# Patient Record
Sex: FEMALE | Race: BLACK OR AFRICAN AMERICAN | NOT HISPANIC OR LATINO | Employment: OTHER | URBAN - METROPOLITAN AREA
[De-identification: names, ages, dates, MRNs, and addresses within clinical notes are randomized per-mention and may not be internally consistent; named-entity substitution may affect disease eponyms.]

---

## 2023-11-17 RX ORDER — TRAZODONE HYDROCHLORIDE 100 MG/1
100 TABLET ORAL NIGHTLY
COMMUNITY
Start: 2023-04-11 | End: 2024-04-17

## 2024-04-16 DIAGNOSIS — F51.01 PRIMARY INSOMNIA: Primary | ICD-10-CM

## 2024-04-17 RX ORDER — TRAZODONE HYDROCHLORIDE 100 MG/1
100 TABLET ORAL NIGHTLY PRN
Qty: 90 TABLET | Refills: 0 | Status: SHIPPED | OUTPATIENT
Start: 2024-04-17 | End: 2024-04-18 | Stop reason: SDUPTHER

## 2024-04-18 ENCOUNTER — OFFICE VISIT (OUTPATIENT)
Dept: NEUROLOGY | Facility: CLINIC | Age: 79
End: 2024-04-18
Payer: MEDICARE

## 2024-04-18 VITALS — SYSTOLIC BLOOD PRESSURE: 107 MMHG | HEART RATE: 66 BPM | DIASTOLIC BLOOD PRESSURE: 62 MMHG | TEMPERATURE: 97.3 F

## 2024-04-18 DIAGNOSIS — G45.9 TIA (TRANSIENT ISCHEMIC ATTACK): ICD-10-CM

## 2024-04-18 DIAGNOSIS — R47.01 APHASIA: ICD-10-CM

## 2024-04-18 DIAGNOSIS — F02.C0 SEVERE LATE ONSET ALZHEIMER'S DEMENTIA WITHOUT BEHAVIORAL DISTURBANCE, PSYCHOTIC DISTURBANCE, MOOD DISTURBANCE, OR ANXIETY (MULTI): Primary | ICD-10-CM

## 2024-04-18 DIAGNOSIS — F51.01 PRIMARY INSOMNIA: ICD-10-CM

## 2024-04-18 DIAGNOSIS — G30.1 SEVERE LATE ONSET ALZHEIMER'S DEMENTIA WITHOUT BEHAVIORAL DISTURBANCE, PSYCHOTIC DISTURBANCE, MOOD DISTURBANCE, OR ANXIETY (MULTI): Primary | ICD-10-CM

## 2024-04-18 PROCEDURE — 1159F MED LIST DOCD IN RCRD: CPT | Performed by: PSYCHIATRY & NEUROLOGY

## 2024-04-18 PROCEDURE — 1036F TOBACCO NON-USER: CPT | Performed by: PSYCHIATRY & NEUROLOGY

## 2024-04-18 PROCEDURE — 1160F RVW MEDS BY RX/DR IN RCRD: CPT | Performed by: PSYCHIATRY & NEUROLOGY

## 2024-04-18 PROCEDURE — 99214 OFFICE O/P EST MOD 30 MIN: CPT | Performed by: PSYCHIATRY & NEUROLOGY

## 2024-04-18 RX ORDER — MULTIVITAMIN
1 TABLET ORAL
COMMUNITY
Start: 2023-04-05

## 2024-04-18 RX ORDER — ATORVASTATIN CALCIUM 40 MG/1
40 TABLET, FILM COATED ORAL
COMMUNITY
Start: 2024-01-12

## 2024-04-18 RX ORDER — TRAZODONE HYDROCHLORIDE 100 MG/1
100 TABLET ORAL NIGHTLY
Qty: 90 TABLET | Refills: 3 | Status: SHIPPED | OUTPATIENT
Start: 2024-04-18

## 2024-04-18 RX ORDER — CLOPIDOGREL BISULFATE 75 MG/1
75 TABLET ORAL
COMMUNITY
Start: 2024-03-28 | End: 2024-06-26

## 2024-04-18 RX ORDER — SERTRALINE HYDROCHLORIDE 100 MG/1
100 TABLET, FILM COATED ORAL
COMMUNITY
Start: 2024-01-18

## 2024-04-18 RX ORDER — ERGOCALCIFEROL 1.25 MG/1
1 CAPSULE ORAL
COMMUNITY
Start: 2024-03-22

## 2024-04-18 RX ORDER — LANOLIN ALCOHOL/MO/W.PET/CERES
1000 CREAM (GRAM) TOPICAL
COMMUNITY
Start: 2023-07-18

## 2024-04-18 RX ORDER — TRAZODONE HYDROCHLORIDE 100 MG/1
100 TABLET ORAL NIGHTLY PRN
Qty: 90 TABLET | Refills: 3 | Status: SHIPPED | OUTPATIENT
Start: 2024-04-18 | End: 2024-04-18 | Stop reason: SDUPTHER

## 2024-04-18 RX ORDER — MIRTAZAPINE 15 MG/1
15 TABLET, FILM COATED ORAL DAILY PRN
COMMUNITY
Start: 2024-01-03

## 2024-04-18 NOTE — PROGRESS NOTES
NEUROLOGY OUTPATIENT FOLLOW-UP NOTE    Assessment/Plan   Diagnoses and all orders for this visit:  Severe late onset Alzheimer's dementia without behavioral disturbance, psychotic disturbance, mood disturbance, or anxiety (Multi)  Primary insomnia  -     traZODone (Desyrel) 100 mg tablet; Take 1 tablet (100 mg) by mouth once daily at bedtime.  TIA (transient ischemic attack)  Aphasia    IMPRESSION:  Advanced Alzheimer's with aphasia and behavioral disturbance.  Insomnia.  Recent TIA.    PLAN:  Continue clopidogrel and atorvastatin for stroke prophylaxis.  I refilled trazodone for insomnia.  Quetiapine prn agitation.  I will see her in 3-4 months or prn.      Sergio Sadler Jr., M.D., FAAN   ----------    Subjective     Ingrid Christianson is a 78 y.o. year old female here for stroke follow-up.    The patient had transient facial droop in March and was admitted to [facility].  She was started on clopidogrel.  Trazodone still helps with insomnia, and when she has it, she sleeps all night.  She can have intermittent agitation in the evening, typically 3-4 nights a week.    No past medical history on file.  Past Surgical History:   Procedure Laterality Date    OTHER SURGICAL HISTORY  06/30/2022    Hernia repair     Social History     Tobacco Use    Smoking status: Never    Smokeless tobacco: Never   Substance Use Topics    Alcohol use: Not Currently     family history is not on file.    Current Outpatient Medications:     atorvastatin (Lipitor) 40 mg tablet, Take 1 tablet (40 mg) by mouth once daily., Disp: , Rfl:     clopidogrel (Plavix) 75 mg tablet, 1 tablet (75 mg) by Enteral route once daily., Disp: , Rfl:     cyanocobalamin (Vitamin B-12) 1,000 mcg tablet, Take 1 tablet (1,000 mcg) by mouth once daily., Disp: , Rfl:     ergocalciferol (Vitamin D-2) 1.25 MG (13409 UT) capsule, Take 1 capsule (1,250 mcg) by mouth 1 (one) time per week., Disp: , Rfl:     mirtazapine (Remeron) 15 mg tablet, Take 1 tablet (15 mg)  "by mouth once daily as needed., Disp: , Rfl:     multivitamin tablet, Take 1 tablet by mouth once daily., Disp: , Rfl:     sertraline (Zoloft) 100 mg tablet, Take 1 tablet (100 mg) by mouth once daily., Disp: , Rfl:     aspirin-calcium carbonate 81 mg-300 mg calcium(777 mg) tablet, Take by mouth., Disp: , Rfl:     calcium carbonate-vit D3-min (Citrical & Minerals + Vit D) 600 mg calcium- 200 unit tablet, Take by mouth., Disp: , Rfl:     traZODone (Desyrel) 100 mg tablet, TAKE 1 TABLET AT BEDTIME AS NEEDED FOR SLEEP, Disp: 90 tablet, Rfl: 0  No Known Allergies    Objective     Temp 36.3 °C (97.3 °F)     CONSTITUTIONAL:  No acute distress    MENTAL STATUS:  Awake, alert, cannot assess orientation because of current cognitive status.    SPEECH AND LANGUAGE:  Cannot name or repeat, follows no commands, can say \"no\" clearly at times.    CRANIAL NERVES:  II-Vision grossly present as she tracks me as I move around the room.    III/IV/VI--EOMs are present in all directions with the oculocephalic maneuver.  Pupils are symmetrically reactive in dim light.  No ptosis.    V--Normal corneals bilaterally.    VII--No facial asymmetry.    VIII--Hearing grossly present.    IX/X--Unable because of cognitive status    XI--Spontaneous, antigravity trapezius power bilaterally.    XII--Tongue protrudes without deviation.    MOTOR:  Symmetric antigravity power, tone, and bulk in both arms and both legs.  Prefers to keep left fingers clenched but the fingers can be opened with minimal force.    SENSORY:  Grimaces to nailbed pressure in all four extremities.    COORDINATION:  Unable because of cognitive condition.    REFLEXES are normal and symmetric at the biceps, triceps, brachioradialis, patella, and ankle.  The plantar responses are flexor.    GAIT is normal, without steppage, ataxia, shuffling, or spasticity.      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     Arterial blood flow was measured to detect acute large vessel occlusion   by computer aided detection software: Not Performed.   Concordance between software and imaging review: Not Applicable.     COMMUNICATION:  Communicated with GILBERTO BEAVERS on 3/23/2024 3:39 PM  via   verbal communication.     Cranial CT 3/25/2024 at [facility] (copied):  IMPRESSION:    No acute intracranial findings. Diffuse volume loss. Chronic ischemic  white matter changes.            Transcribed Using Voice Recognition  Transcribe Date/Time: Mar 25 2024 12:21P    Dictated by: LESLIE SAUER MD    This examination was interpreted and the report reviewed and  electronically signed by:  LESLIE SAUER MD on Mar 25 2024 12:25PM  EST  Narrative    * * *Final Report* * *    DATE OF EXAM: Mar 25 2024 11:10AM      [facility] [place]   0504  -  CT BRAIN WO IVCON  / ACCESSION #  [id number]    PROCEDURE REASON: Neuro deficit, acute, stroke suspected        * * * * Physician Interpretation * * * *    RESULT: EXAMINATION: CT BRAIN WO IVCON    CLINICAL HISTORY: Stroke.    TECHNIQUE:  Serial axial images without IV contrast were obtained from  the vertex to the foramen magnum.  MQ:  CTBWO_3    CT Radiation dose: Integrated Dose-Length Product (DLP) for this visit =    836  mGy*cm  CT Dose Reduction Employed: Automated exposure control (AEC)    COMPARISON: Head CT March 23, 2024      RESULT:    There is diffuse volume loss. Patchy foci of low attenuation in the  periventricular and subcortical white matter are nonspecific and most  consistent with chronic small vessel ischemia. There is no hemorrhage,  edema or mass effect. There  are no new foci of abnormal attenuation in  the brain parenchyma within the limitations of the study. Osseous  structures are intact. There is patchy mucosal thickening in bilateral  ethmoid air cells.     (topogram) images: No additional findings.    ---  Sergio Sadler Jr., M.D., [facility]

## 2024-04-18 NOTE — LETTER
April 18, 2024     Zahraa Pa DO  [address] [address]    Patient: Ingrid Christianson   YOB: 1945   Date of Visit: 4/18/2024       Dear Dr. Zahraa Pa DO:    Thank you for allowing me to continue in the neurological care of your patient,  Ingrid Christianson. Below are my notes for this visit.  If you have questions, please do not hesitate to call me.        Sincerely,     Sergio Sadler Jr., M.D., FAAN     ______________________________________________________________________________________    NEUROLOGY OUTPATIENT FOLLOW-UP NOTE    Assessment/Plan  Diagnoses and all orders for this visit:  Severe late onset Alzheimer's dementia without behavioral disturbance, psychotic disturbance, mood disturbance, or anxiety (Multi)  Primary insomnia  -     traZODone (Desyrel) 100 mg tablet; Take 1 tablet (100 mg) by mouth once daily at bedtime.  TIA (transient ischemic attack)  Aphasia    IMPRESSION:  Advanced Alzheimer's with aphasia and behavioral disturbance.  Insomnia.  Recent TIA.    PLAN:  Continue clopidogrel and atorvastatin for stroke prophylaxis.  I refilled trazodone for insomnia.  Quetiapine prn agitation.  I will see her in 3-4 months or prn.      Sergio Sadler Jr., M.D., FAAN   ----------    Subjective    Ingrid Christianson is a 78 y.o. year old female here for stroke follow-up.    The patient had transient facial droop in March and was admitted to [facility].  She was started on clopidogrel.  Trazodone still helps with insomnia, and when she has it, she sleeps all night.  She can have intermittent agitation in the evening, typically 3-4 nights a week.    No past medical history on file.  Past Surgical History:   Procedure Laterality Date   • OTHER SURGICAL HISTORY  06/30/2022    Hernia repair     Social History     Tobacco Use   • Smoking status: Never   • Smokeless tobacco: Never   Substance Use Topics   • Alcohol use: Not  "Currently     family history is not on file.    Current Outpatient Medications:   •  atorvastatin (Lipitor) 40 mg tablet, Take 1 tablet (40 mg) by mouth once daily., Disp: , Rfl:   •  clopidogrel (Plavix) 75 mg tablet, 1 tablet (75 mg) by Enteral route once daily., Disp: , Rfl:   •  cyanocobalamin (Vitamin B-12) 1,000 mcg tablet, Take 1 tablet (1,000 mcg) by mouth once daily., Disp: , Rfl:   •  ergocalciferol (Vitamin D-2) 1.25 MG (61037 UT) capsule, Take 1 capsule (1,250 mcg) by mouth 1 (one) time per week., Disp: , Rfl:   •  mirtazapine (Remeron) 15 mg tablet, Take 1 tablet (15 mg) by mouth once daily as needed., Disp: , Rfl:   •  multivitamin tablet, Take 1 tablet by mouth once daily., Disp: , Rfl:   •  sertraline (Zoloft) 100 mg tablet, Take 1 tablet (100 mg) by mouth once daily., Disp: , Rfl:   •  aspirin-calcium carbonate 81 mg-300 mg calcium(777 mg) tablet, Take by mouth., Disp: , Rfl:   •  calcium carbonate-vit D3-min (Citrical & Minerals + Vit D) 600 mg calcium- 200 unit tablet, Take by mouth., Disp: , Rfl:   •  traZODone (Desyrel) 100 mg tablet, TAKE 1 TABLET AT BEDTIME AS NEEDED FOR SLEEP, Disp: 90 tablet, Rfl: 0  No Known Allergies    Objective    Temp 36.3 °C (97.3 °F)     CONSTITUTIONAL:  No acute distress    MENTAL STATUS:  Awake, alert, cannot assess orientation because of current cognitive status.    SPEECH AND LANGUAGE:  Cannot name or repeat, follows no commands, can say \"no\" clearly at times.    CRANIAL NERVES:  II-Vision grossly present as she tracks me as I move around the room.    III/IV/VI--EOMs are present in all directions with the oculocephalic maneuver.  Pupils are symmetrically reactive in dim light.  No ptosis.    V--Normal corneals bilaterally.    VII--No facial asymmetry.    VIII--Hearing grossly present.    IX/X--Unable because of cognitive status    XI--Spontaneous, antigravity trapezius power bilaterally.    XII--Tongue protrudes without deviation.    MOTOR:  Symmetric antigravity " power, tone, and bulk in both arms and both legs.  Prefers to keep left fingers clenched but the fingers can be opened with minimal force.    SENSORY:  Grimaces to nailbed pressure in all four extremities.    COORDINATION:  Unable because of cognitive condition.    REFLEXES are normal and symmetric at the biceps, triceps, brachioradialis, patella, and ankle.  The plantar responses are flexor.    GAIT is normal, without steppage, ataxia, shuffling, or spasticity.      Cranial CT and cranial/cervical CTA 3/23/2024 at [facility] (copied):    IMPRESSION:     Severe stenoses at the proximal left MCA M1 segment with patent distal   flow.     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     Arterial blood flow was measured to detect acute large vessel occlusion   by computer aided detection software: Not Performed.   Concordance between software and imaging review: Not Applicable.     COMMUNICATION:  Communicated with GILBERTO BEAVERS on 3/23/2024 3:39 PM  via   verbal communication.     Cranial CT 3/25/2024 at [facility] (copied):  IMPRESSION:    No acute intracranial findings. Diffuse volume loss. Chronic ischemic  white matter changes.            Transcribed Using Voice Recognition  Transcribe Date/Time: Mar 25 2024 12:21P    Dictated by: LESLIE SAUER MD    This examination was interpreted and the report reviewed and  electronically signed by:  LESLIE SAUER MD on Mar 25 2024 12:25PM  EST  Narrative    * * *Final Report* * *    DATE OF EXAM: Mar 25 2024 11:10AM      [facility] [place]   0504  -  CT BRAIN WO IVCON  / ACCESSION #  [id number]    PROCEDURE REASON: Neuro deficit, acute, stroke suspected        * * * * Physician Interpretation * * * *    RESULT: EXAMINATION: CT BRAIN WO  IVCON    CLINICAL HISTORY: Stroke.    TECHNIQUE:  Serial axial images without IV contrast were obtained from  the vertex to the foramen magnum.  MQ:  CTBWO_3    CT Radiation dose: Integrated Dose-Length Product (DLP) for this visit =    836  mGy*cm  CT Dose Reduction Employed: Automated exposure control (AEC)    COMPARISON: Head CT March 23, 2024      RESULT:    There is diffuse volume loss. Patchy foci of low attenuation in the  periventricular and subcortical white matter are nonspecific and most  consistent with chronic small vessel ischemia. There is no hemorrhage,  edema or mass effect. There are no new foci of abnormal attenuation in  the brain parenchyma within the limitations of the study. Osseous  structures are intact. There is patchy mucosal thickening in bilateral  ethmoid air cells.     (topogram) images: No additional findings.    ---  Sergio Sadler Jr., M.D., FAAN

## 2024-04-23 ENCOUNTER — TELEPHONE (OUTPATIENT)
Dept: NEUROLOGY | Facility: CLINIC | Age: 79
End: 2024-04-23

## 2024-04-23 DIAGNOSIS — F02.C0 SEVERE LATE ONSET ALZHEIMER'S DEMENTIA WITHOUT BEHAVIORAL DISTURBANCE, PSYCHOTIC DISTURBANCE, MOOD DISTURBANCE, OR ANXIETY (MULTI): Primary | ICD-10-CM

## 2024-04-23 DIAGNOSIS — G30.1 SEVERE LATE ONSET ALZHEIMER'S DEMENTIA WITHOUT BEHAVIORAL DISTURBANCE, PSYCHOTIC DISTURBANCE, MOOD DISTURBANCE, OR ANXIETY (MULTI): Primary | ICD-10-CM

## 2024-04-23 RX ORDER — QUETIAPINE FUMARATE 50 MG/1
50 TABLET, FILM COATED ORAL NIGHTLY
Qty: 30 TABLET | Refills: 3 | Status: SHIPPED | OUTPATIENT
Start: 2024-04-23 | End: 2024-08-21

## 2024-07-25 ENCOUNTER — APPOINTMENT (OUTPATIENT)
Dept: NEUROLOGY | Facility: CLINIC | Age: 79
End: 2024-07-25
Payer: MEDICARE

## 2024-07-25 VITALS
WEIGHT: 88 LBS | TEMPERATURE: 97.3 F | DIASTOLIC BLOOD PRESSURE: 60 MMHG | BODY MASS INDEX: 7.99 KG/M2 | SYSTOLIC BLOOD PRESSURE: 120 MMHG | HEART RATE: 60 BPM

## 2024-07-25 DIAGNOSIS — G30.1 SEVERE LATE ONSET ALZHEIMER'S DEMENTIA WITHOUT BEHAVIORAL DISTURBANCE, PSYCHOTIC DISTURBANCE, MOOD DISTURBANCE, OR ANXIETY (MULTI): Primary | ICD-10-CM

## 2024-07-25 DIAGNOSIS — R47.01 APHASIA: ICD-10-CM

## 2024-07-25 DIAGNOSIS — F02.C0 SEVERE LATE ONSET ALZHEIMER'S DEMENTIA WITHOUT BEHAVIORAL DISTURBANCE, PSYCHOTIC DISTURBANCE, MOOD DISTURBANCE, OR ANXIETY (MULTI): Primary | ICD-10-CM

## 2024-07-25 DIAGNOSIS — F51.01 PRIMARY INSOMNIA: ICD-10-CM

## 2024-07-25 PROCEDURE — 1159F MED LIST DOCD IN RCRD: CPT | Performed by: PSYCHIATRY & NEUROLOGY

## 2024-07-25 PROCEDURE — 99214 OFFICE O/P EST MOD 30 MIN: CPT | Performed by: PSYCHIATRY & NEUROLOGY

## 2024-07-25 PROCEDURE — 1160F RVW MEDS BY RX/DR IN RCRD: CPT | Performed by: PSYCHIATRY & NEUROLOGY

## 2024-07-25 PROCEDURE — 1036F TOBACCO NON-USER: CPT | Performed by: PSYCHIATRY & NEUROLOGY

## 2024-07-25 RX ORDER — DOCUSATE SODIUM 100 MG/1
100 CAPSULE, LIQUID FILLED ORAL 2 TIMES DAILY
COMMUNITY

## 2024-07-25 RX ORDER — CLOPIDOGREL BISULFATE 75 MG/1
75 TABLET ORAL
COMMUNITY
Start: 2024-05-17 | End: 2024-08-15

## 2024-07-25 NOTE — PROGRESS NOTES
NEUROLOGY OUTPATIENT FOLLOW-UP NOTE    Assessment/Plan   Diagnoses and all orders for this visit:  Severe late onset Alzheimer's dementia without behavioral disturbance, psychotic disturbance, mood disturbance, or anxiety (Multi)  Primary insomnia  Aphasia      IMPRESSION:  Advanced Alzheimer's with aphasia and behavioral disturbance.  Insomnia.     PLAN:  Continue clopidogrel and atorvastatin for stroke prophylaxis.  To toncinue trazodone for insomnia and nightly quetiapine for agitation.  I filled out an expert evaluation form at the visit.  I will see her again in six months or prn.      Sergio Sadler Jr., M.D., FAAN   ----------    Subjective     Ingrid Christianson is a 79 y.o. year old female here for follow-up.  A daughter accompanies her today and provides all history.    Quetiapine helps the agitation and she is given it nightly.  Trazodone continues to help insomnia.  No worsening of memory loss.    No past medical history on file.  Past Surgical History:   Procedure Laterality Date    OTHER SURGICAL HISTORY  06/30/2022    Hernia repair     Social History     Tobacco Use    Smoking status: Former     Types: Cigarettes    Smokeless tobacco: Never   Substance Use Topics    Alcohol use: Not Currently     family history is not on file.    Current Outpatient Medications:     clopidogrel (Plavix) 75 mg tablet, 1 tablet (75 mg) by Enteral route once daily., Disp: , Rfl:     aspirin-calcium carbonate 81 mg-300 mg calcium(777 mg) tablet, Take by mouth., Disp: , Rfl:     atorvastatin (Lipitor) 40 mg tablet, Take 1 tablet (40 mg) by mouth once daily., Disp: , Rfl:     calcium carbonate-vit D3-min (Citrical & Minerals + Vit D) 600 mg calcium- 200 unit tablet, Take by mouth., Disp: , Rfl:     cyanocobalamin (Vitamin B-12) 1,000 mcg tablet, Take 1 tablet (1,000 mcg) by mouth once daily., Disp: , Rfl:     docusate sodium (Colace) 100 mg capsule, Take 100 capsules (10,000 mg) by mouth 2 times a day., Disp: , Rfl:      "ergocalciferol (Vitamin D-2) 1.25 MG (56197 UT) capsule, Take 1 capsule (1,250 mcg) by mouth 1 (one) time per week., Disp: , Rfl:     mirtazapine (Remeron) 15 mg tablet, Take 1 tablet (15 mg) by mouth once daily as needed., Disp: , Rfl:     multivitamin tablet, Take 1 tablet by mouth once daily., Disp: , Rfl:     QUEtiapine (SeroqueL) 50 mg tablet, Take 1 tablet (50 mg) by mouth once daily at bedtime., Disp: 30 tablet, Rfl: 3    sertraline (Zoloft) 100 mg tablet, Take 1 tablet (100 mg) by mouth once daily., Disp: , Rfl:     traZODone (Desyrel) 100 mg tablet, Take 1 tablet (100 mg) by mouth once daily at bedtime., Disp: 90 tablet, Rfl: 3  No Known Allergies    Objective     /60   Pulse 60   Temp 36.3 °C (97.3 °F)   Wt (!) 39.9 kg (88 lb)   BMI 7.99 kg/m²       CONSTITUTIONAL:  No acute distress     MENTAL STATUS:  Awake, alert, cannot assess orientation because of current cognitive status.     SPEECH AND LANGUAGE:  Cannot name or repeat, follows no commands, can say \"no\" clearly at times.     CRANIAL NERVES:  II-Vision grossly present as she tracks me as I move around the room.     III/IV/VI--EOMs are present in all directions with the oculocephalic maneuver.  Pupils are symmetrically reactive in dim light.  No ptosis.     V--Normal corneals bilaterally.     VII--No facial asymmetry.     VIII--Hearing grossly present.     IX/X--Unable because of cognitive status     XI--Spontaneous, antigravity trapezius power bilaterally.     XII--Tongue protrudes without deviation.     MOTOR:  Symmetric antigravity power, tone, and bulk in both arms and both legs.  Prefers to keep left fingers clenched but the fingers can be opened with minimal force.     SENSORY:  Grimaces to nailbed pressure in all four extremities.     COORDINATION:  Unable because of cognitive condition.     REFLEXES are normal and symmetric at the biceps, triceps, brachioradialis, patella, and ankle.  The plantar responses are flexor.     GAIT is " normal, without steppage, ataxia, shuffling, or spasticity.      Sergio Sadler Jr., M.D., [facility]

## 2024-07-29 DIAGNOSIS — F02.C0 SEVERE LATE ONSET ALZHEIMER'S DEMENTIA WITHOUT BEHAVIORAL DISTURBANCE, PSYCHOTIC DISTURBANCE, MOOD DISTURBANCE, OR ANXIETY (MULTI): ICD-10-CM

## 2024-07-29 DIAGNOSIS — G30.1 SEVERE LATE ONSET ALZHEIMER'S DEMENTIA WITHOUT BEHAVIORAL DISTURBANCE, PSYCHOTIC DISTURBANCE, MOOD DISTURBANCE, OR ANXIETY (MULTI): ICD-10-CM

## 2024-08-02 RX ORDER — QUETIAPINE FUMARATE 50 MG/1
50 TABLET, FILM COATED ORAL NIGHTLY
Qty: 90 TABLET | Refills: 3 | Status: SHIPPED | OUTPATIENT
Start: 2024-08-02

## 2025-01-30 ENCOUNTER — APPOINTMENT (OUTPATIENT)
Dept: NEUROLOGY | Facility: CLINIC | Age: 80
End: 2025-01-30
Payer: MEDICARE

## 2025-02-25 ENCOUNTER — TELEPHONE (OUTPATIENT)
Dept: NEUROLOGY | Facility: CLINIC | Age: 80
End: 2025-02-25
Payer: MEDICARE